# Patient Record
Sex: MALE | Race: WHITE | Employment: UNEMPLOYED | URBAN - METROPOLITAN AREA
[De-identification: names, ages, dates, MRNs, and addresses within clinical notes are randomized per-mention and may not be internally consistent; named-entity substitution may affect disease eponyms.]

---
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
## 2024-12-31 PROCEDURE — 88304 TISSUE EXAM BY PATHOLOGIST: CPT | Mod: 26 | Performed by: PATHOLOGY

## 2024-12-31 PROCEDURE — 88304 TISSUE EXAM BY PATHOLOGIST: CPT | Mod: TC,ORL | Performed by: OTOLARYNGOLOGY

## 2025-01-02 ENCOUNTER — LAB REQUISITION (OUTPATIENT)
Dept: LAB | Facility: CLINIC | Age: 20
End: 2025-01-02
Payer: COMMERCIAL

## 2025-01-02 DIAGNOSIS — J35.1 HYPERTROPHY OF TONSILS: ICD-10-CM

## 2025-01-03 LAB
PATH REPORT.COMMENTS IMP SPEC: NORMAL
PATH REPORT.COMMENTS IMP SPEC: NORMAL
PATH REPORT.FINAL DX SPEC: NORMAL
PATH REPORT.GROSS SPEC: NORMAL
PATH REPORT.MICROSCOPIC SPEC OTHER STN: NORMAL
PATH REPORT.RELEVANT HX SPEC: NORMAL
PHOTO IMAGE: NORMAL

## 2025-07-21 ENCOUNTER — OFFICE VISIT (OUTPATIENT)
Dept: FAMILY MEDICINE | Facility: CLINIC | Age: 20
End: 2025-07-21

## 2025-07-21 VITALS
WEIGHT: 179.4 LBS | SYSTOLIC BLOOD PRESSURE: 118 MMHG | DIASTOLIC BLOOD PRESSURE: 78 MMHG | OXYGEN SATURATION: 99 % | BODY MASS INDEX: 24.3 KG/M2 | HEIGHT: 72 IN | TEMPERATURE: 97.4 F | HEART RATE: 79 BPM

## 2025-07-21 DIAGNOSIS — R07.89 ATYPICAL CHEST PAIN: ICD-10-CM

## 2025-07-21 DIAGNOSIS — F84.0 AUTISM SPECTRUM DISORDER: Primary | ICD-10-CM

## 2025-07-21 LAB
BUN SERPL-MCNC: 15 MG/DL (ref 7–25)
BUN/CREATININE RATIO: 16 (ref 6–32)
CALCIUM SERPL-MCNC: 9.5 MG/DL (ref 8.6–10.3)
CHLORIDE SERPLBLD-SCNC: 99.5 MMOL/L (ref 98–110)
CO2 SERPL-SCNC: 29.9 MMOL/L (ref 20–32)
CREAT SERPL-MCNC: 0.95 MG/DL (ref 0.6–1.3)
ERYTHROCYTE [DISTWIDTH] IN BLOOD BY AUTOMATED COUNT: 12.7 %
GLUCOSE SERPL-MCNC: 92 MG/DL (ref 60–99)
HCT VFR BLD AUTO: 48.3 % (ref 40–53)
HEMOGLOBIN: 15.6 G/DL (ref 13.3–17.7)
MCH RBC QN AUTO: 29.4 PG (ref 26–33)
MCHC RBC AUTO-ENTMCNC: 32.3 G/DL (ref 31–36)
MCV RBC AUTO: 91.2 FL (ref 78–100)
PLATELET COUNT - QUEST: 178 10^9/L (ref 150–375)
POTASSIUM SERPL-SCNC: 4.47 MMOL/L (ref 3.5–5.3)
RBC # BLD AUTO: 5.3 10*12/L (ref 4.4–5.9)
SODIUM SERPL-SCNC: 137.5 MMOL/L (ref 135–146)
WBC # BLD AUTO: 4.9 10*9/L (ref 4–11)

## 2025-07-21 PROCEDURE — 80048 BASIC METABOLIC PNL TOTAL CA: CPT | Performed by: STUDENT IN AN ORGANIZED HEALTH CARE EDUCATION/TRAINING PROGRAM

## 2025-07-21 PROCEDURE — 84443 ASSAY THYROID STIM HORMONE: CPT | Mod: 90 | Performed by: STUDENT IN AN ORGANIZED HEALTH CARE EDUCATION/TRAINING PROGRAM

## 2025-07-21 PROCEDURE — 99204 OFFICE O/P NEW MOD 45 MIN: CPT | Performed by: STUDENT IN AN ORGANIZED HEALTH CARE EDUCATION/TRAINING PROGRAM

## 2025-07-21 PROCEDURE — 85027 COMPLETE CBC AUTOMATED: CPT | Performed by: STUDENT IN AN ORGANIZED HEALTH CARE EDUCATION/TRAINING PROGRAM

## 2025-07-21 PROCEDURE — 93000 ELECTROCARDIOGRAM COMPLETE: CPT | Performed by: STUDENT IN AN ORGANIZED HEALTH CARE EDUCATION/TRAINING PROGRAM

## 2025-07-21 PROCEDURE — 36415 COLL VENOUS BLD VENIPUNCTURE: CPT | Performed by: STUDENT IN AN ORGANIZED HEALTH CARE EDUCATION/TRAINING PROGRAM

## 2025-07-21 PROCEDURE — G2211 COMPLEX E/M VISIT ADD ON: HCPCS | Performed by: STUDENT IN AN ORGANIZED HEALTH CARE EDUCATION/TRAINING PROGRAM

## 2025-07-21 NOTE — PROGRESS NOTES
"Assessment & Plan       ICD-10-CM    1. Autism spectrum disorder  F84.0       2. Atypical chest pain  R07.89 Hemogram Platelet ([facility])     TSH WITH FREE T4 REFLEX ([facility])     Basic Metabolic Panel ([facility])     EKG 12-lead complete w/read - Clinics     Echocardiogram Complete         Intermittent nonexertional chest pains. Labs and EKG today. Suspect benign precordial catch variant but given lack of family hx and possible congenital heart anomaly recommend echo for further structural eval. Follow-up after echo.     Transferring primary care, chart reviewed and updated.      Patient Instructions   Blood work and EKG today    Echo at [facility]: [phone]     Reasons to follow-up sooner or seek emergent care reviewed.     46 minutes spent on the date of the encounter doing chart review, history and exam, documentation and further activities per the note      Iam Young MD, [facility]      Subjective     Nba Marrero is a 20 year old male who presents to clinic today for the following health issues:    HPI   Chief Complaint   Patient presents with    New Patient     Establish care, was previously going to [facility]. Adopted from [place] so has limited information on genetic family history. Has hx of autoimmune encephalitis.     Consult     Pain in chest started at the beginning of the summer - concerned about pericarditis - wants to do some testing. Pain is inconsistent but happens frequently - was worse when he started his summer job. Worse on hot summer days. Sharp and sudden but doesn't last long, happens randomly - no apparent trigger.  Med management for wellbutrin, does not currently need refills.       Intermittent episodes of left sided chest pain over past few months. Nonexertional, no pattern or trigger. Feels like a \"catch or rub\" then pain goes away within ~1 min.   Adopted at 14 months, was told he had \"hole in his heart\". Evaluation per pediatrician, no " concerns.  Has had intermittent elevated BP readings noted.  Had play for [employer] cardiac screen in high school that was normal.  No hx of asthma.  No recent viral infection.   No formal childhood sports but active hiking and cycling, up to 15 miles without any sx. Has been in heat and at altitude without any concerns.   Only rx med is wellbutrin, stable dose for years. No changes to supplements recently.   Senior at [employer], works in environmental research-shoreline erosion        Objective    /78 (BP Location: Left arm, Patient Position: Sitting, Cuff Size: Adult Regular)   Pulse 79   Temp 97.4  F (36.3  C) (Temporal)   Ht 1.829 m (6')   Wt 81.4 kg (179 lb 6.4 oz)   SpO2 99%   BMI 24.33 kg/m    Body mass index is 24.33 kg/m .  Alert, NAD  NC/AT  Sclerae anicteric  Neck supple  RRR  Resp nonlabored CTAB  Skin warm and dry  No focal neuro deficits. Speech intact. Normal gait.  Appropriate affect       Labs reviewed.  EKG: NSR, normal intervals, J point elevation, no TWI

## 2025-07-21 NOTE — NURSING NOTE
Chief Complaint   Patient presents with    New Patient     Establish care, was previously going to [facility]. Adopted from [place] so has limited information on genetic family history. Has hx of autoimmune encephalitis.     Consult     Pain in chest started at the beginning of the summer - concerned about pericarditis - wants to do some testing. Pain is inconsistent but happens frequently - was worse when he started his summer job. Worse on hot summer days. Sharp and sudden but doesn't last long, triggered by certain positions and movements.  Med management for wellbutrin, does not currently need refills.      Pre-visit Screening:  Immunizations:  not up to date - will do later  Colonoscopy:  na  Mammogram: na  Asthma Action Test/Plan:  na  PHQ9:  phq2 given  GAD7:  na  Questioned patient about current smoking habits Pt. has never smoked.  Ok to leave detailed message on voice mail for today's visit only yes, phone # [phone] (home)

## 2025-07-22 ENCOUNTER — TELEPHONE (OUTPATIENT)
Dept: FAMILY MEDICINE | Facility: CLINIC | Age: 20
End: 2025-07-22

## 2025-07-22 LAB — TSH SERPL-ACNC: 2.07 MIU/L (ref 0.4–4.5)

## 2025-07-24 ENCOUNTER — TRANSFERRED RECORDS (OUTPATIENT)
Dept: FAMILY MEDICINE | Facility: CLINIC | Age: 20
End: 2025-07-24

## 2025-08-02 ENCOUNTER — HEALTH MAINTENANCE LETTER (OUTPATIENT)
Age: 20
End: 2025-08-02

## 2025-08-19 ENCOUNTER — HOSPITAL ENCOUNTER (OUTPATIENT)
Dept: CARDIOLOGY | Facility: CLINIC | Age: 20
Discharge: HOME | End: 2025-08-19
Attending: STUDENT IN AN ORGANIZED HEALTH CARE EDUCATION/TRAINING PROGRAM
Payer: COMMERCIAL

## 2025-08-19 DIAGNOSIS — R07.89 ATYPICAL CHEST PAIN: ICD-10-CM

## 2025-08-19 LAB — LVEF ECHO: NORMAL

## 2025-08-19 PROCEDURE — 93306 TTE W/DOPPLER COMPLETE: CPT

## 2025-08-19 PROCEDURE — 93306 TTE W/DOPPLER COMPLETE: CPT | Mod: 26 | Performed by: INTERNAL MEDICINE

## (undated) RX ORDER — LIDOCAINE HYDROCHLORIDE 10 MG/ML
INJECTION, SOLUTION EPIDURAL; INFILTRATION; INTRACAUDAL; PERINEURAL
Status: DISPENSED
Start: 2018-08-31